# Patient Record
Sex: FEMALE | Race: OTHER | HISPANIC OR LATINO
[De-identification: names, ages, dates, MRNs, and addresses within clinical notes are randomized per-mention and may not be internally consistent; named-entity substitution may affect disease eponyms.]

---

## 2018-02-28 ENCOUNTER — APPOINTMENT (OUTPATIENT)
Dept: ANTEPARTUM | Facility: CLINIC | Age: 33
End: 2018-02-28
Payer: MEDICAID

## 2018-02-28 ENCOUNTER — ASOB RESULT (OUTPATIENT)
Age: 33
End: 2018-02-28

## 2018-02-28 PROCEDURE — 76801 OB US < 14 WKS SINGLE FETUS: CPT

## 2018-03-19 ENCOUNTER — APPOINTMENT (OUTPATIENT)
Dept: ANTEPARTUM | Facility: CLINIC | Age: 33
End: 2018-03-19

## 2018-03-20 ENCOUNTER — APPOINTMENT (OUTPATIENT)
Dept: ANTEPARTUM | Facility: CLINIC | Age: 33
End: 2018-03-20
Payer: MEDICAID

## 2018-03-20 ENCOUNTER — ASOB RESULT (OUTPATIENT)
Age: 33
End: 2018-03-20

## 2018-03-20 ENCOUNTER — APPOINTMENT (OUTPATIENT)
Dept: MATERNAL FETAL MEDICINE | Facility: CLINIC | Age: 33
End: 2018-03-20
Payer: MEDICAID

## 2018-03-20 VITALS
WEIGHT: 102.25 LBS | BODY MASS INDEX: 19.3 KG/M2 | HEIGHT: 61 IN | OXYGEN SATURATION: 99 % | DIASTOLIC BLOOD PRESSURE: 62 MMHG | RESPIRATION RATE: 16 BRPM | HEART RATE: 72 BPM | SYSTOLIC BLOOD PRESSURE: 110 MMHG

## 2018-03-20 DIAGNOSIS — Z82.3 FAMILY HISTORY OF STROKE: ICD-10-CM

## 2018-03-20 DIAGNOSIS — Z82.5 FAMILY HISTORY OF ASTHMA AND OTHER CHRONIC LOWER RESPIRATORY DISEASES: ICD-10-CM

## 2018-03-20 DIAGNOSIS — Z87.440 PERSONAL HISTORY OF URINARY (TRACT) INFECTIONS: ICD-10-CM

## 2018-03-20 DIAGNOSIS — Z82.49 FAMILY HISTORY OF ISCHEMIC HEART DISEASE AND OTHER DISEASES OF THE CIRCULATORY SYSTEM: ICD-10-CM

## 2018-03-20 DIAGNOSIS — N76.1 SUBACUTE AND CHRONIC VAGINITIS: ICD-10-CM

## 2018-03-20 DIAGNOSIS — R82.71 BACTERIURIA: ICD-10-CM

## 2018-03-20 DIAGNOSIS — Z87.51 PERSONAL HISTORY OF PRE-TERM LABOR: ICD-10-CM

## 2018-03-20 PROCEDURE — 76801 OB US < 14 WKS SINGLE FETUS: CPT

## 2018-03-20 PROCEDURE — 99212 OFFICE O/P EST SF 10 MIN: CPT

## 2018-03-20 PROCEDURE — 76813 OB US NUCHAL MEAS 1 GEST: CPT

## 2018-03-20 PROCEDURE — 36416 COLLJ CAPILLARY BLOOD SPEC: CPT

## 2018-03-20 RX ORDER — ASCORBIC ACID, CHOLECALCIFEROL, .ALPHA.-TOCOPHEROL ACETATE, DL-, PYRIDOXINE, FOLIC ACID, CYANOCOBALAMIN, CALCIUM, FERROUS FUMARATE, MAGNESIUM, DOCONEXENT 85; 200; 10; 25; 1; 12; 140; 27; 45; 300 [IU]/1; [IU]/1; [IU]/1; [IU]/1; MG/1; UG/1; MG/1; MG/1; MG/1; MG/1
CAPSULE, GELATIN COATED ORAL
Refills: 0 | Status: ACTIVE | COMMUNITY

## 2018-03-20 RX ORDER — DOCUSATE SODIUM 50 MG/1
50 CAPSULE, LIQUID FILLED ORAL
Refills: 0 | Status: ACTIVE | COMMUNITY

## 2018-03-26 LAB
1ST TRIMESTER DATA: NORMAL
ADDENDUM DOC: NORMAL
AFP PNL SERPL: NORMAL
AFP SERPL-ACNC: NORMAL
CLINICAL BIOCHEMIST REVIEW: NORMAL
FREE BETA HCG 1ST TRIMESTER: NORMAL
Lab: NORMAL
NOTES NTD: NORMAL
NT: NORMAL
PAPP-A SERPL-ACNC: NORMAL
TRISOMY 18/3: NORMAL

## 2018-04-17 ENCOUNTER — APPOINTMENT (OUTPATIENT)
Dept: ANTEPARTUM | Facility: CLINIC | Age: 33
End: 2018-04-17
Payer: MEDICAID

## 2018-04-17 ENCOUNTER — APPOINTMENT (OUTPATIENT)
Dept: MATERNAL FETAL MEDICINE | Facility: CLINIC | Age: 33
End: 2018-04-17
Payer: MEDICAID

## 2018-04-17 ENCOUNTER — ASOB RESULT (OUTPATIENT)
Age: 33
End: 2018-04-17

## 2018-04-17 VITALS
BODY MASS INDEX: 20.22 KG/M2 | WEIGHT: 107.13 LBS | OXYGEN SATURATION: 97 % | SYSTOLIC BLOOD PRESSURE: 98 MMHG | RESPIRATION RATE: 16 BRPM | HEIGHT: 61 IN | DIASTOLIC BLOOD PRESSURE: 56 MMHG

## 2018-04-17 DIAGNOSIS — O09.219 SUPERVISION OF PREGNANCY WITH HISTORY OF PRE-TERM LABOR, UNSPECIFIED TRIMESTER: ICD-10-CM

## 2018-04-17 PROCEDURE — 76816 OB US FOLLOW-UP PER FETUS: CPT

## 2018-04-17 PROCEDURE — ZZZZZ: CPT

## 2018-05-03 ENCOUNTER — MOBILE ON CALL (OUTPATIENT)
Age: 33
End: 2018-05-03

## 2018-05-09 ENCOUNTER — APPOINTMENT (OUTPATIENT)
Dept: ANTEPARTUM | Facility: CLINIC | Age: 33
End: 2018-05-09
Payer: MEDICAID

## 2018-05-09 ENCOUNTER — MED ADMIN CHARGE (OUTPATIENT)
Age: 33
End: 2018-05-09

## 2018-05-09 ENCOUNTER — ASOB RESULT (OUTPATIENT)
Age: 33
End: 2018-05-09

## 2018-05-09 PROCEDURE — 76811 OB US DETAILED SNGL FETUS: CPT

## 2018-05-09 PROCEDURE — 36415 COLL VENOUS BLD VENIPUNCTURE: CPT

## 2018-05-09 PROCEDURE — 76817 TRANSVAGINAL US OBSTETRIC: CPT

## 2018-05-09 RX ORDER — HYDROXYPROGESTERONE CAPROATE 250 MG/ML
275 INJECTION SUBCUTANEOUS WEEKLY
Refills: 0 | Status: ACTIVE | COMMUNITY
Start: 2018-05-09

## 2018-05-09 RX ORDER — HYDROXYPROGESTERONE CAPROATE 250 MG/ML
275 INJECTION SUBCUTANEOUS
Refills: 0 | Status: COMPLETED | OUTPATIENT
Start: 2018-05-09

## 2018-05-16 ENCOUNTER — APPOINTMENT (OUTPATIENT)
Dept: ANTEPARTUM | Facility: CLINIC | Age: 33
End: 2018-05-16

## 2018-05-16 LAB
1ST TRIMESTER DATA: NORMAL
2ND TRIMESTER DATA: NORMAL
AFP PNL SERPL: NORMAL
AFP SERPL-ACNC: NORMAL
AFP SERPL-ACNC: NORMAL
B-HCG FREE SERPL-MCNC: NORMAL
CLINICAL BIOCHEMIST REVIEW: NORMAL
FREE BETA HCG 1ST TRIMESTER: NORMAL
INHIBIN A SERPL-MCNC: NORMAL
NOTES NTD: NORMAL
NT: NORMAL
PAPP-A SERPL-ACNC: NORMAL
U ESTRIOL SERPL-SCNC: NORMAL

## 2018-05-16 RX ORDER — HYDROXYPROGESTERONE CAPROATE 250 MG/ML
275 INJECTION SUBCUTANEOUS
Refills: 0 | Status: COMPLETED | OUTPATIENT
Start: 2018-05-16

## 2018-05-16 RX ADMIN — HYDROXYPROGESTERONE CAPROATE 0 MG/1.1ML: 250 INJECTION SUBCUTANEOUS at 00:00

## 2018-05-23 ENCOUNTER — APPOINTMENT (OUTPATIENT)
Dept: MATERNAL FETAL MEDICINE | Facility: CLINIC | Age: 33
End: 2018-05-23
Payer: MEDICAID

## 2018-05-23 ENCOUNTER — APPOINTMENT (OUTPATIENT)
Dept: ANTEPARTUM | Facility: CLINIC | Age: 33
End: 2018-05-23

## 2018-05-23 VITALS
SYSTOLIC BLOOD PRESSURE: 112 MMHG | DIASTOLIC BLOOD PRESSURE: 60 MMHG | OXYGEN SATURATION: 98 % | HEART RATE: 80 BPM | HEIGHT: 61 IN | WEIGHT: 114.31 LBS | RESPIRATION RATE: 16 BRPM | BODY MASS INDEX: 21.58 KG/M2

## 2018-05-23 DIAGNOSIS — O09.212 SUPERVISION OF PREGNANCY WITH HISTORY OF PRE-TERM LABOR, SECOND TRIMESTER: ICD-10-CM

## 2018-05-23 PROCEDURE — 99214 OFFICE O/P EST MOD 30 MIN: CPT

## 2018-05-23 RX ORDER — HYDROXYPROGESTERONE CAPROATE 250 MG/ML
275 INJECTION SUBCUTANEOUS
Refills: 0 | Status: COMPLETED | OUTPATIENT
Start: 2018-05-23

## 2018-05-23 RX ADMIN — HYDROXYPROGESTERONE CAPROATE 0 MG/1.1ML: 250 INJECTION SUBCUTANEOUS at 00:00

## 2018-05-30 ENCOUNTER — APPOINTMENT (OUTPATIENT)
Dept: ANTEPARTUM | Facility: CLINIC | Age: 33
End: 2018-05-30

## 2018-05-30 RX ORDER — HYDROXYPROGESTERONE CAPROATE 250 MG/ML
275 INJECTION SUBCUTANEOUS
Refills: 0 | Status: COMPLETED | OUTPATIENT
Start: 2018-05-30

## 2018-05-30 RX ADMIN — HYDROXYPROGESTERONE CAPROATE 0 MG/1.1ML: 250 INJECTION SUBCUTANEOUS at 00:00

## 2018-06-06 ENCOUNTER — APPOINTMENT (OUTPATIENT)
Dept: ANTEPARTUM | Facility: CLINIC | Age: 33
End: 2018-06-06

## 2018-06-06 RX ORDER — HYDROXYPROGESTERONE CAPROATE 250 MG/ML
275 INJECTION SUBCUTANEOUS
Refills: 0 | Status: COMPLETED | OUTPATIENT
Start: 2018-06-06

## 2018-06-06 RX ADMIN — HYDROXYPROGESTERONE CAPROATE 0 MG/1.1ML: 250 INJECTION SUBCUTANEOUS at 00:00

## 2018-06-13 ENCOUNTER — APPOINTMENT (OUTPATIENT)
Dept: ANTEPARTUM | Facility: CLINIC | Age: 33
End: 2018-06-13

## 2018-06-13 RX ORDER — HYDROXYPROGESTERONE CAPROATE 250 MG/ML
275 INJECTION SUBCUTANEOUS
Refills: 0 | Status: COMPLETED | OUTPATIENT
Start: 2018-06-13

## 2018-06-13 RX ADMIN — HYDROXYPROGESTERONE CAPROATE 0 MG/1.1ML: 250 INJECTION SUBCUTANEOUS at 00:00

## 2018-06-14 ENCOUNTER — APPOINTMENT (OUTPATIENT)
Dept: ANTEPARTUM | Facility: CLINIC | Age: 33
End: 2018-06-14
Payer: MEDICAID

## 2018-06-14 ENCOUNTER — APPOINTMENT (OUTPATIENT)
Dept: PEDIATRIC CARDIOLOGY | Facility: CLINIC | Age: 33
End: 2018-06-14
Payer: MEDICAID

## 2018-06-14 ENCOUNTER — ASOB RESULT (OUTPATIENT)
Age: 33
End: 2018-06-14

## 2018-06-14 DIAGNOSIS — O35.9XX0 MATERNAL CARE FOR (SUSPECTED) FETAL ABNORMALITY AND DAMAGE, UNSPECIFIED, NOT APPLICABLE OR UNSPECIFIED: ICD-10-CM

## 2018-06-14 DIAGNOSIS — O35.2XX0 MATERNAL CARE FOR (SUSPECTED) HEREDITARY DISEASE IN FETUS, NOT APPLICABLE OR UNSPECIFIED: ICD-10-CM

## 2018-06-14 DIAGNOSIS — O09.892 SUPERVISION OF OTHER HIGH RISK PREGNANCIES, SECOND TRIMESTER: ICD-10-CM

## 2018-06-14 PROCEDURE — 76827 ECHO EXAM OF FETAL HEART: CPT

## 2018-06-14 PROCEDURE — 99203 OFFICE O/P NEW LOW 30 MIN: CPT | Mod: 25

## 2018-06-14 PROCEDURE — 76816 OB US FOLLOW-UP PER FETUS: CPT

## 2018-06-14 PROCEDURE — 93325 DOPPLER ECHO COLOR FLOW MAPG: CPT

## 2018-06-14 PROCEDURE — 76825 ECHO EXAM OF FETAL HEART: CPT

## 2018-06-19 PROBLEM — O35.9XX0 KNOWN OR SUSPECTED FETAL ANOMALY AFFECTING ANTEPARTUM CARE OF MOTHER: Status: ACTIVE | Noted: 2018-06-19

## 2018-06-19 PROBLEM — O35.2XX0 HEREDITARY DISEASE IN FAMILY POSSIBLY AFFECTING FETUS: Status: ACTIVE | Noted: 2018-06-19

## 2018-06-19 PROBLEM — O09.892: Status: ACTIVE | Noted: 2018-05-23

## 2018-06-20 ENCOUNTER — APPOINTMENT (OUTPATIENT)
Dept: ANTEPARTUM | Facility: CLINIC | Age: 33
End: 2018-06-20

## 2018-06-20 RX ORDER — HYDROXYPROGESTERONE CAPROATE 250 MG/ML
275 INJECTION SUBCUTANEOUS
Refills: 0 | Status: COMPLETED | OUTPATIENT
Start: 2018-06-20

## 2018-06-20 RX ADMIN — HYDROXYPROGESTERONE CAPROATE 0 MG/1.1ML: 250 INJECTION SUBCUTANEOUS at 00:00

## 2018-06-27 ENCOUNTER — MED ADMIN CHARGE (OUTPATIENT)
Age: 33
End: 2018-06-27

## 2018-06-27 ENCOUNTER — APPOINTMENT (OUTPATIENT)
Dept: ANTEPARTUM | Facility: CLINIC | Age: 33
End: 2018-06-27

## 2018-06-28 RX ORDER — HYDROXYPROGESTERONE CAPROATE 250 MG/ML
275 INJECTION SUBCUTANEOUS
Refills: 0 | Status: COMPLETED | OUTPATIENT
Start: 2018-06-28

## 2018-07-05 ENCOUNTER — ASOB RESULT (OUTPATIENT)
Age: 33
End: 2018-07-05

## 2018-07-05 ENCOUNTER — APPOINTMENT (OUTPATIENT)
Dept: ANTEPARTUM | Facility: CLINIC | Age: 33
End: 2018-07-05
Payer: MEDICAID

## 2018-07-05 PROCEDURE — 76817 TRANSVAGINAL US OBSTETRIC: CPT

## 2018-07-05 RX ORDER — HYDROXYPROGESTERONE CAPROATE 250 MG/ML
275 INJECTION SUBCUTANEOUS
Refills: 0 | Status: COMPLETED | OUTPATIENT
Start: 2018-07-05

## 2018-07-05 RX ADMIN — HYDROXYPROGESTERONE CAPROATE 0 MG/1.1ML: 250 INJECTION SUBCUTANEOUS at 00:00

## 2018-07-10 ENCOUNTER — APPOINTMENT (OUTPATIENT)
Dept: ANTEPARTUM | Facility: CLINIC | Age: 33
End: 2018-07-10

## 2018-07-13 ENCOUNTER — APPOINTMENT (OUTPATIENT)
Dept: ANTEPARTUM | Facility: CLINIC | Age: 33
End: 2018-07-13

## 2018-07-13 RX ORDER — HYDROXYPROGESTERONE CAPROATE 250 MG/ML
275 INJECTION SUBCUTANEOUS
Refills: 0 | Status: COMPLETED | OUTPATIENT
Start: 2018-07-13

## 2018-07-13 RX ADMIN — HYDROXYPROGESTERONE CAPROATE 0 MG/1.1ML: 250 INJECTION SUBCUTANEOUS at 00:00

## 2018-07-19 ENCOUNTER — APPOINTMENT (OUTPATIENT)
Dept: ANTEPARTUM | Facility: CLINIC | Age: 33
End: 2018-07-19

## 2018-07-19 RX ORDER — HYDROXYPROGESTERONE CAPROATE 250 MG/ML
275 INJECTION SUBCUTANEOUS
Refills: 0 | Status: COMPLETED | OUTPATIENT
Start: 2018-07-19

## 2018-07-19 RX ADMIN — HYDROXYPROGESTERONE CAPROATE 0 MG/1.1ML: 250 INJECTION SUBCUTANEOUS at 00:00

## 2018-07-24 ENCOUNTER — APPOINTMENT (OUTPATIENT)
Dept: MATERNAL FETAL MEDICINE | Facility: CLINIC | Age: 33
End: 2018-07-24
Payer: MEDICAID

## 2018-07-24 ENCOUNTER — ASOB RESULT (OUTPATIENT)
Age: 33
End: 2018-07-24

## 2018-07-24 ENCOUNTER — APPOINTMENT (OUTPATIENT)
Dept: ANTEPARTUM | Facility: CLINIC | Age: 33
End: 2018-07-24
Payer: MEDICAID

## 2018-07-24 VITALS — HEIGHT: 62 IN | WEIGHT: 123.06 LBS | BODY MASS INDEX: 22.65 KG/M2

## 2018-07-24 DIAGNOSIS — O24.410 GESTATIONAL DIABETES MELLITUS IN PREGNANCY, DIET CONTROLLED: ICD-10-CM

## 2018-07-24 DIAGNOSIS — Z83.3 FAMILY HISTORY OF DIABETES MELLITUS: ICD-10-CM

## 2018-07-24 PROCEDURE — 76816 OB US FOLLOW-UP PER FETUS: CPT

## 2018-07-24 PROCEDURE — 76819 FETAL BIOPHYS PROFIL W/O NST: CPT

## 2018-07-24 PROCEDURE — 93976 VASCULAR STUDY: CPT

## 2018-07-24 PROCEDURE — 76820 UMBILICAL ARTERY ECHO: CPT

## 2018-07-24 PROCEDURE — G0108 DIAB MANAGE TRN  PER INDIV: CPT

## 2018-07-24 RX ORDER — URINE ACETONE TEST STRIPS
STRIP MISCELLANEOUS
Qty: 2 | Refills: 1 | Status: ACTIVE | COMMUNITY
Start: 2018-07-24 | End: 1900-01-01

## 2018-07-26 ENCOUNTER — APPOINTMENT (OUTPATIENT)
Dept: ANTEPARTUM | Facility: CLINIC | Age: 33
End: 2018-07-26

## 2018-07-26 RX ORDER — HYDROXYPROGESTERONE CAPROATE 250 MG/ML
275 INJECTION SUBCUTANEOUS
Refills: 0 | Status: COMPLETED | OUTPATIENT
Start: 2018-07-26

## 2018-07-26 RX ADMIN — HYDROXYPROGESTERONE CAPROATE 0 MG/1.1ML: 250 INJECTION SUBCUTANEOUS at 00:00

## 2018-07-27 ENCOUNTER — RX CHANGE (OUTPATIENT)
Age: 33
End: 2018-07-27

## 2018-07-27 RX ORDER — BLOOD SUGAR DIAGNOSTIC
STRIP MISCELLANEOUS
Qty: 150 | Refills: 3 | Status: DISCONTINUED | COMMUNITY
Start: 2018-07-24 | End: 2018-07-27

## 2018-07-27 RX ORDER — LANCETS
EACH MISCELLANEOUS
Qty: 2 | Refills: 6 | Status: ACTIVE | COMMUNITY
Start: 2018-07-27 | End: 1900-01-01

## 2018-07-27 RX ORDER — LANCETS 30 GAUGE
EACH MISCELLANEOUS
Qty: 150 | Refills: 2 | Status: DISCONTINUED | COMMUNITY
Start: 2018-07-24 | End: 2018-07-27

## 2018-07-27 RX ORDER — BLOOD-GLUCOSE METER
W/DEVICE KIT MISCELLANEOUS
Qty: 1 | Refills: 0 | Status: ACTIVE | COMMUNITY
Start: 2018-07-27 | End: 1900-01-01

## 2018-07-31 ENCOUNTER — OTHER (OUTPATIENT)
Age: 33
End: 2018-07-31

## 2018-07-31 RX ORDER — BLOOD SUGAR DIAGNOSTIC
STRIP MISCELLANEOUS
Qty: 3 | Refills: 3 | Status: ACTIVE | COMMUNITY
Start: 2018-07-27 | End: 1900-01-01

## 2018-08-02 ENCOUNTER — APPOINTMENT (OUTPATIENT)
Dept: ANTEPARTUM | Facility: CLINIC | Age: 33
End: 2018-08-02

## 2018-08-02 ENCOUNTER — APPOINTMENT (OUTPATIENT)
Dept: MATERNAL FETAL MEDICINE | Facility: CLINIC | Age: 33
End: 2018-08-02
Payer: MEDICAID

## 2018-08-02 VITALS — HEIGHT: 62 IN | WEIGHT: 124.25 LBS | BODY MASS INDEX: 22.87 KG/M2

## 2018-08-02 DIAGNOSIS — Z3A.22 22 WEEKS GESTATION OF PREGNANCY: ICD-10-CM

## 2018-08-02 PROCEDURE — G0108 DIAB MANAGE TRN  PER INDIV: CPT

## 2018-08-02 RX ORDER — HYDROXYPROGESTERONE CAPROATE 250 MG/ML
275 INJECTION SUBCUTANEOUS
Refills: 0 | Status: COMPLETED | OUTPATIENT
Start: 2018-08-02

## 2018-08-02 RX ADMIN — HYDROXYPROGESTERONE CAPROATE 0 MG/1.1ML: 250 INJECTION SUBCUTANEOUS at 00:00

## 2018-08-03 ENCOUNTER — ASOB RESULT (OUTPATIENT)
Age: 33
End: 2018-08-03

## 2018-08-06 ENCOUNTER — APPOINTMENT (OUTPATIENT)
Dept: FAMILY MEDICINE | Facility: CLINIC | Age: 33
End: 2018-08-06
Payer: MEDICAID

## 2018-08-06 VITALS
TEMPERATURE: 98.4 F | SYSTOLIC BLOOD PRESSURE: 107 MMHG | OXYGEN SATURATION: 97 % | BODY MASS INDEX: 21.26 KG/M2 | WEIGHT: 120 LBS | HEIGHT: 63 IN | DIASTOLIC BLOOD PRESSURE: 63 MMHG | HEART RATE: 70 BPM

## 2018-08-06 DIAGNOSIS — R05 COUGH: ICD-10-CM

## 2018-08-06 DIAGNOSIS — Z76.89 PERSONS ENCOUNTERING HEALTH SERVICES IN OTHER SPECIFIED CIRCUMSTANCES: ICD-10-CM

## 2018-08-06 DIAGNOSIS — T78.40XA ALLERGY, UNSPECIFIED, INITIAL ENCOUNTER: ICD-10-CM

## 2018-08-06 PROCEDURE — 99203 OFFICE O/P NEW LOW 30 MIN: CPT

## 2018-08-06 RX ORDER — DIPHENHYDRAMINE HCL 25 MG/1
25 CAPSULE ORAL
Qty: 30 | Refills: 0 | Status: ACTIVE | COMMUNITY
Start: 2018-08-06 | End: 1900-01-01

## 2018-08-06 NOTE — ASSESSMENT
[FreeTextEntry1] : 31 y/o HF  at 33 wks EGA , presents today for persistent cough: most likely allergic.\par \par -Pt will start Benadryl with previous consult w/ OB.\par \par -F/up in 2 weeks,.

## 2018-08-06 NOTE — PAST MEDICAL HISTORY
[Menstruating] : hx of menstruating [Definite ___ (Date)] : the last menstrual period was [unfilled] [Total Preg ___] : G: [unfilled] [Premature ___] : Premature: [unfilled] [Living ___] : Living: [unfilled]

## 2018-08-06 NOTE — HISTORY OF PRESENT ILLNESS
[FreeTextEntry8] : 31 y/o HF, originally from [facility], , now at 33 weeks gestation w/ gestational DM, and hx postpartum cardiomyopathy, presents today for evaluation of persistent cough at night, states is dry and can be coughing for 2 hours. \par Pt reports symptoms for the last 2 weeks, and usually is at night after lying down, and then early in AM.\par \par States is only dry cough, no other symptoms reported. feels like something is stuck/ itchy in throat.

## 2018-08-06 NOTE — PHYSICAL EXAM

## 2018-08-09 ENCOUNTER — APPOINTMENT (OUTPATIENT)
Dept: MATERNAL FETAL MEDICINE | Facility: CLINIC | Age: 33
End: 2018-08-09

## 2018-08-09 ENCOUNTER — ASOB RESULT (OUTPATIENT)
Age: 33
End: 2018-08-09

## 2018-08-09 ENCOUNTER — APPOINTMENT (OUTPATIENT)
Dept: ANTEPARTUM | Facility: CLINIC | Age: 33
End: 2018-08-09

## 2018-08-09 VITALS — BODY MASS INDEX: 22.15 KG/M2 | HEIGHT: 63 IN | WEIGHT: 125 LBS

## 2018-08-09 RX ORDER — HYDROXYPROGESTERONE CAPROATE 250 MG/ML
275 INJECTION SUBCUTANEOUS
Refills: 0 | Status: COMPLETED | OUTPATIENT
Start: 2018-08-09

## 2018-08-09 RX ADMIN — HYDROXYPROGESTERONE CAPROATE 0 MG/1.1ML: 250 INJECTION SUBCUTANEOUS at 00:00

## 2018-08-16 ENCOUNTER — ASOB RESULT (OUTPATIENT)
Age: 33
End: 2018-08-16

## 2018-08-16 ENCOUNTER — APPOINTMENT (OUTPATIENT)
Dept: MATERNAL FETAL MEDICINE | Facility: CLINIC | Age: 33
End: 2018-08-16
Payer: MEDICAID

## 2018-08-16 ENCOUNTER — APPOINTMENT (OUTPATIENT)
Dept: ANTEPARTUM | Facility: CLINIC | Age: 33
End: 2018-08-16

## 2018-08-16 VITALS — WEIGHT: 126.44 LBS | HEIGHT: 63 IN | BODY MASS INDEX: 22.4 KG/M2

## 2018-08-16 PROCEDURE — G0108 DIAB MANAGE TRN  PER INDIV: CPT

## 2018-08-16 RX ORDER — HYDROXYPROGESTERONE CAPROATE 250 MG/ML
275 INJECTION SUBCUTANEOUS
Refills: 0 | Status: COMPLETED | OUTPATIENT
Start: 2018-08-16

## 2018-08-16 RX ADMIN — HYDROXYPROGESTERONE CAPROATE 0 MG/1.1ML: 250 INJECTION SUBCUTANEOUS at 00:00

## 2018-08-23 ENCOUNTER — APPOINTMENT (OUTPATIENT)
Dept: ANTEPARTUM | Facility: CLINIC | Age: 33
End: 2018-08-23
Payer: MEDICAID

## 2018-08-23 ENCOUNTER — ASOB RESULT (OUTPATIENT)
Age: 33
End: 2018-08-23

## 2018-08-23 PROCEDURE — 76819 FETAL BIOPHYS PROFIL W/O NST: CPT

## 2018-08-23 PROCEDURE — 76816 OB US FOLLOW-UP PER FETUS: CPT

## 2018-08-23 PROCEDURE — 93976 VASCULAR STUDY: CPT

## 2018-08-23 PROCEDURE — 76820 UMBILICAL ARTERY ECHO: CPT

## 2018-08-23 RX ORDER — HYDROXYPROGESTERONE CAPROATE 250 MG/ML
275 INJECTION SUBCUTANEOUS
Refills: 0 | Status: COMPLETED | OUTPATIENT
Start: 2018-08-23

## 2018-08-23 RX ADMIN — HYDROXYPROGESTERONE CAPROATE 0 MG/1.1ML: 250 INJECTION SUBCUTANEOUS at 00:00

## 2018-08-28 ENCOUNTER — TRANSCRIPTION ENCOUNTER (OUTPATIENT)
Age: 33
End: 2018-08-28

## 2018-08-29 ENCOUNTER — RESULT REVIEW (OUTPATIENT)
Age: 33
End: 2018-08-29

## 2018-08-29 ENCOUNTER — INPATIENT (INPATIENT)
Facility: HOSPITAL | Age: 33
LOS: 2 days | Discharge: HOME | End: 2018-09-01
Attending: OBSTETRICS & GYNECOLOGY | Admitting: OBSTETRICS & GYNECOLOGY
Payer: COMMERCIAL

## 2018-08-29 VITALS — WEIGHT: 123.46 LBS | HEIGHT: 63 IN

## 2018-08-29 DIAGNOSIS — O47.1 FALSE LABOR AT OR AFTER 37 COMPLETED WEEKS OF GESTATION: ICD-10-CM

## 2018-08-29 LAB
ABO RH CONFIRMATION: SIGNIFICANT CHANGE UP
ANION GAP SERPL CALC-SCNC: 13 MMOL/L — SIGNIFICANT CHANGE UP (ref 5–17)
BASE EXCESS BLDCOA CALC-SCNC: -5 MMOL/L — LOW (ref -2–2)
BASE EXCESS BLDCOV CALC-SCNC: -3.1 MMOL/L — LOW (ref -2–2)
BASOPHILS # BLD AUTO: 0 K/UL — SIGNIFICANT CHANGE UP (ref 0–0.2)
BASOPHILS # BLD AUTO: 0 K/UL — SIGNIFICANT CHANGE UP (ref 0–0.2)
BASOPHILS NFR BLD AUTO: 0.2 % — SIGNIFICANT CHANGE UP (ref 0–2)
BASOPHILS NFR BLD AUTO: 0.5 % — SIGNIFICANT CHANGE UP (ref 0–2)
BLD GP AB SCN SERPL QL: SIGNIFICANT CHANGE UP
BUN SERPL-MCNC: 8 MG/DL — SIGNIFICANT CHANGE UP (ref 8–20)
CALCIUM SERPL-MCNC: 8.8 MG/DL — SIGNIFICANT CHANGE UP (ref 8.6–10.2)
CHLORIDE SERPL-SCNC: 102 MMOL/L — SIGNIFICANT CHANGE UP (ref 98–107)
CO2 SERPL-SCNC: 24 MMOL/L — SIGNIFICANT CHANGE UP (ref 22–29)
CREAT SERPL-MCNC: 0.64 MG/DL — SIGNIFICANT CHANGE UP (ref 0.5–1.3)
EOSINOPHIL # BLD AUTO: 0 K/UL — SIGNIFICANT CHANGE UP (ref 0–0.5)
EOSINOPHIL # BLD AUTO: 0.2 K/UL — SIGNIFICANT CHANGE UP (ref 0–0.5)
EOSINOPHIL NFR BLD AUTO: 0.3 % — SIGNIFICANT CHANGE UP (ref 0–6)
EOSINOPHIL NFR BLD AUTO: 2.3 % — SIGNIFICANT CHANGE UP (ref 0–6)
GAS PNL BLDCOV: 7.28 — SIGNIFICANT CHANGE UP (ref 7.25–7.45)
GLUCOSE SERPL-MCNC: 82 MG/DL — SIGNIFICANT CHANGE UP (ref 70–115)
HCO3 BLDCOA-SCNC: 18 MMOL/L — LOW (ref 21–29)
HCO3 BLDCOV-SCNC: 20 MMOL/L — LOW (ref 21–29)
HCT VFR BLD CALC: 33.5 % — LOW (ref 37–47)
HCT VFR BLD CALC: 36.1 % — LOW (ref 37–47)
HGB BLD-MCNC: 10.9 G/DL — LOW (ref 12–16)
HGB BLD-MCNC: 11.6 G/DL — LOW (ref 12–16)
HIV 1 & 2 AB SERPL IA.RAPID: SIGNIFICANT CHANGE UP
LYMPHOCYTES # BLD AUTO: 1.5 K/UL — SIGNIFICANT CHANGE UP (ref 1–4.8)
LYMPHOCYTES # BLD AUTO: 1.5 K/UL — SIGNIFICANT CHANGE UP (ref 1–4.8)
LYMPHOCYTES # BLD AUTO: 17.8 % — LOW (ref 20–55)
LYMPHOCYTES # BLD AUTO: 9.9 % — LOW (ref 20–55)
MCHC RBC-ENTMCNC: 28.7 PG — SIGNIFICANT CHANGE UP (ref 27–31)
MCHC RBC-ENTMCNC: 29.1 PG — SIGNIFICANT CHANGE UP (ref 27–31)
MCHC RBC-ENTMCNC: 32.1 G/DL — SIGNIFICANT CHANGE UP (ref 32–36)
MCHC RBC-ENTMCNC: 32.5 G/DL — SIGNIFICANT CHANGE UP (ref 32–36)
MCV RBC AUTO: 89.3 FL — SIGNIFICANT CHANGE UP (ref 81–99)
MCV RBC AUTO: 89.4 FL — SIGNIFICANT CHANGE UP (ref 81–99)
MONOCYTES # BLD AUTO: 0.7 K/UL — SIGNIFICANT CHANGE UP (ref 0–0.8)
MONOCYTES # BLD AUTO: 1 K/UL — HIGH (ref 0–0.8)
MONOCYTES NFR BLD AUTO: 6.6 % — SIGNIFICANT CHANGE UP (ref 3–10)
MONOCYTES NFR BLD AUTO: 8 % — SIGNIFICANT CHANGE UP (ref 3–10)
NEUTROPHILS # BLD AUTO: 12.2 K/UL — HIGH (ref 1.8–8)
NEUTROPHILS # BLD AUTO: 5.8 K/UL — SIGNIFICANT CHANGE UP (ref 1.8–8)
NEUTROPHILS NFR BLD AUTO: 71 % — SIGNIFICANT CHANGE UP (ref 37–73)
NEUTROPHILS NFR BLD AUTO: 82.6 % — HIGH (ref 37–73)
PCO2 BLDCOA: 59.2 MMHG — SIGNIFICANT CHANGE UP (ref 32–68)
PCO2 BLDCOV: 53.6 MMHG — HIGH (ref 29–53)
PH BLDCOA: 7.22 — SIGNIFICANT CHANGE UP (ref 7.18–7.38)
PLATELET # BLD AUTO: 224 K/UL — SIGNIFICANT CHANGE UP (ref 150–400)
PLATELET # BLD AUTO: 227 K/UL — SIGNIFICANT CHANGE UP (ref 150–400)
PO2 BLDCOA: 15.3 MMHG — SIGNIFICANT CHANGE UP (ref 5.7–30.5)
PO2 BLDCOA: 22 MMHG — SIGNIFICANT CHANGE UP (ref 17–41)
POTASSIUM SERPL-MCNC: 4.1 MMOL/L — SIGNIFICANT CHANGE UP (ref 3.5–5.3)
POTASSIUM SERPL-SCNC: 4.1 MMOL/L — SIGNIFICANT CHANGE UP (ref 3.5–5.3)
RBC # BLD: 3.75 M/UL — LOW (ref 4.4–5.2)
RBC # BLD: 4.04 M/UL — LOW (ref 4.4–5.2)
RBC # FLD: 13 % — SIGNIFICANT CHANGE UP (ref 11–15.6)
RBC # FLD: 13.1 % — SIGNIFICANT CHANGE UP (ref 11–15.6)
SAO2 % BLDCOA: SIGNIFICANT CHANGE UP
SAO2 % BLDCOV: SIGNIFICANT CHANGE UP
SODIUM SERPL-SCNC: 139 MMOL/L — SIGNIFICANT CHANGE UP (ref 135–145)
TYPE + AB SCN PNL BLD: SIGNIFICANT CHANGE UP
URATE SERPL-MCNC: 4.3 MG/DL — SIGNIFICANT CHANGE UP (ref 2.4–5.7)
WBC # BLD: 14.8 K/UL — HIGH (ref 4.8–10.8)
WBC # BLD: 8.2 K/UL — SIGNIFICANT CHANGE UP (ref 4.8–10.8)
WBC # FLD AUTO: 14.8 K/UL — HIGH (ref 4.8–10.8)
WBC # FLD AUTO: 8.2 K/UL — SIGNIFICANT CHANGE UP (ref 4.8–10.8)

## 2018-08-29 PROCEDURE — 88302 TISSUE EXAM BY PATHOLOGIST: CPT | Mod: 26

## 2018-08-29 PROCEDURE — 93010 ELECTROCARDIOGRAM REPORT: CPT

## 2018-08-29 RX ORDER — NALBUPHINE HYDROCHLORIDE 10 MG/ML
5 INJECTION, SOLUTION INTRAMUSCULAR; INTRAVENOUS; SUBCUTANEOUS ONCE
Qty: 0 | Refills: 0 | Status: DISCONTINUED | OUTPATIENT
Start: 2018-08-29 | End: 2018-09-01

## 2018-08-29 RX ORDER — OXYTOCIN 10 UNIT/ML
41.67 VIAL (ML) INJECTION
Qty: 20 | Refills: 0 | Status: DISCONTINUED | OUTPATIENT
Start: 2018-08-29 | End: 2018-09-01

## 2018-08-29 RX ORDER — ONDANSETRON 8 MG/1
4 TABLET, FILM COATED ORAL EVERY 6 HOURS
Qty: 0 | Refills: 0 | Status: DISCONTINUED | OUTPATIENT
Start: 2018-08-29 | End: 2018-09-01

## 2018-08-29 RX ORDER — NALOXONE HYDROCHLORIDE 4 MG/.1ML
0.1 SPRAY NASAL
Qty: 0 | Refills: 0 | Status: DISCONTINUED | OUTPATIENT
Start: 2018-08-29 | End: 2018-08-29

## 2018-08-29 RX ORDER — NALBUPHINE HYDROCHLORIDE 10 MG/ML
2.5 INJECTION, SOLUTION INTRAMUSCULAR; INTRAVENOUS; SUBCUTANEOUS EVERY 6 HOURS
Qty: 0 | Refills: 0 | Status: DISCONTINUED | OUTPATIENT
Start: 2018-08-29 | End: 2018-08-29

## 2018-08-29 RX ORDER — SODIUM CHLORIDE 9 MG/ML
1000 INJECTION, SOLUTION INTRAVENOUS
Qty: 0 | Refills: 0 | Status: DISCONTINUED | OUTPATIENT
Start: 2018-08-29 | End: 2018-08-29

## 2018-08-29 RX ORDER — NALOXONE HYDROCHLORIDE 4 MG/.1ML
0.1 SPRAY NASAL
Qty: 0 | Refills: 0 | Status: DISCONTINUED | OUTPATIENT
Start: 2018-08-29 | End: 2018-09-01

## 2018-08-29 RX ORDER — LANOLIN
1 OINTMENT (GRAM) TOPICAL
Qty: 0 | Refills: 0 | Status: DISCONTINUED | OUTPATIENT
Start: 2018-08-29 | End: 2018-09-01

## 2018-08-29 RX ORDER — OXYTOCIN 10 UNIT/ML
333.33 VIAL (ML) INJECTION
Qty: 20 | Refills: 0 | Status: DISCONTINUED | OUTPATIENT
Start: 2018-08-29 | End: 2018-09-01

## 2018-08-29 RX ORDER — LABETALOL HCL 100 MG
200 TABLET ORAL
Qty: 0 | Refills: 0 | Status: DISCONTINUED | OUTPATIENT
Start: 2018-08-29 | End: 2018-08-30

## 2018-08-29 RX ORDER — PENICILLIN G POTASSIUM 5000000 [IU]/1
5 POWDER, FOR SOLUTION INTRAMUSCULAR; INTRAPLEURAL; INTRATHECAL; INTRAVENOUS ONCE
Qty: 0 | Refills: 0 | Status: COMPLETED | OUTPATIENT
Start: 2018-08-29 | End: 2018-08-29

## 2018-08-29 RX ORDER — FERROUS SULFATE 325(65) MG
325 TABLET ORAL DAILY
Qty: 0 | Refills: 0 | Status: DISCONTINUED | OUTPATIENT
Start: 2018-08-29 | End: 2018-09-01

## 2018-08-29 RX ORDER — SIMETHICONE 80 MG/1
80 TABLET, CHEWABLE ORAL EVERY 4 HOURS
Qty: 0 | Refills: 0 | Status: DISCONTINUED | OUTPATIENT
Start: 2018-08-29 | End: 2018-09-01

## 2018-08-29 RX ORDER — KETOROLAC TROMETHAMINE 30 MG/ML
30 SYRINGE (ML) INJECTION EVERY 6 HOURS
Qty: 0 | Refills: 0 | Status: DISCONTINUED | OUTPATIENT
Start: 2018-08-29 | End: 2018-08-29

## 2018-08-29 RX ORDER — HYDRALAZINE HCL 50 MG
5 TABLET ORAL ONCE
Qty: 0 | Refills: 0 | Status: COMPLETED | OUTPATIENT
Start: 2018-08-29 | End: 2018-08-29

## 2018-08-29 RX ORDER — ONDANSETRON 8 MG/1
4 TABLET, FILM COATED ORAL ONCE
Qty: 0 | Refills: 0 | Status: DISCONTINUED | OUTPATIENT
Start: 2018-08-29 | End: 2018-08-29

## 2018-08-29 RX ORDER — GLYCERIN ADULT
1 SUPPOSITORY, RECTAL RECTAL AT BEDTIME
Qty: 0 | Refills: 0 | Status: DISCONTINUED | OUTPATIENT
Start: 2018-08-29 | End: 2018-09-01

## 2018-08-29 RX ORDER — DIPHENHYDRAMINE HCL 50 MG
25 CAPSULE ORAL EVERY 6 HOURS
Qty: 0 | Refills: 0 | Status: DISCONTINUED | OUTPATIENT
Start: 2018-08-29 | End: 2018-09-01

## 2018-08-29 RX ORDER — DIPHENHYDRAMINE HCL 50 MG
25 CAPSULE ORAL EVERY 4 HOURS
Qty: 0 | Refills: 0 | Status: DISCONTINUED | OUTPATIENT
Start: 2018-08-29 | End: 2018-08-29

## 2018-08-29 RX ORDER — OXYTOCIN 10 UNIT/ML
41.67 VIAL (ML) INJECTION
Qty: 20 | Refills: 0 | Status: DISCONTINUED | OUTPATIENT
Start: 2018-08-29 | End: 2018-08-29

## 2018-08-29 RX ORDER — ACETAMINOPHEN 500 MG
650 TABLET ORAL EVERY 6 HOURS
Qty: 0 | Refills: 0 | Status: DISCONTINUED | OUTPATIENT
Start: 2018-08-29 | End: 2018-09-01

## 2018-08-29 RX ORDER — SODIUM CHLORIDE 9 MG/ML
1000 INJECTION, SOLUTION INTRAVENOUS
Qty: 0 | Refills: 0 | Status: DISCONTINUED | OUTPATIENT
Start: 2018-08-29 | End: 2018-09-01

## 2018-08-29 RX ORDER — IBUPROFEN 200 MG
600 TABLET ORAL EVERY 6 HOURS
Qty: 0 | Refills: 0 | Status: DISCONTINUED | OUTPATIENT
Start: 2018-08-29 | End: 2018-09-01

## 2018-08-29 RX ORDER — MAGNESIUM SULFATE 500 MG/ML
2 VIAL (ML) INJECTION
Qty: 40 | Refills: 0 | Status: DISCONTINUED | OUTPATIENT
Start: 2018-08-29 | End: 2018-09-01

## 2018-08-29 RX ORDER — PENICILLIN G POTASSIUM 5000000 [IU]/1
2.5 POWDER, FOR SOLUTION INTRAMUSCULAR; INTRAPLEURAL; INTRATHECAL; INTRAVENOUS EVERY 4 HOURS
Qty: 0 | Refills: 0 | Status: DISCONTINUED | OUTPATIENT
Start: 2018-08-29 | End: 2018-08-29

## 2018-08-29 RX ORDER — OXYCODONE AND ACETAMINOPHEN 5; 325 MG/1; MG/1
1 TABLET ORAL
Qty: 0 | Refills: 0 | Status: DISCONTINUED | OUTPATIENT
Start: 2018-08-29 | End: 2018-09-01

## 2018-08-29 RX ORDER — MAGNESIUM SULFATE 500 MG/ML
4 VIAL (ML) INJECTION ONCE
Qty: 0 | Refills: 0 | Status: COMPLETED | OUTPATIENT
Start: 2018-08-29 | End: 2018-08-29

## 2018-08-29 RX ORDER — TETANUS TOXOID, REDUCED DIPHTHERIA TOXOID AND ACELLULAR PERTUSSIS VACCINE, ADSORBED 5; 2.5; 8; 8; 2.5 [IU]/.5ML; [IU]/.5ML; UG/.5ML; UG/.5ML; UG/.5ML
0.5 SUSPENSION INTRAMUSCULAR ONCE
Qty: 0 | Refills: 0 | Status: COMPLETED | OUTPATIENT
Start: 2018-08-29

## 2018-08-29 RX ORDER — DIPHENHYDRAMINE HCL 50 MG
25 CAPSULE ORAL EVERY 4 HOURS
Qty: 0 | Refills: 0 | Status: DISCONTINUED | OUTPATIENT
Start: 2018-08-29 | End: 2018-09-01

## 2018-08-29 RX ORDER — ACETAMINOPHEN 500 MG
1000 TABLET ORAL ONCE
Qty: 0 | Refills: 0 | Status: DISCONTINUED | OUTPATIENT
Start: 2018-08-29 | End: 2018-09-01

## 2018-08-29 RX ORDER — CITRIC ACID/SODIUM CITRATE 300-500 MG
30 SOLUTION, ORAL ORAL ONCE
Qty: 0 | Refills: 0 | Status: COMPLETED | OUTPATIENT
Start: 2018-08-29 | End: 2018-08-29

## 2018-08-29 RX ORDER — ACETAMINOPHEN 500 MG
1000 TABLET ORAL ONCE
Qty: 0 | Refills: 0 | Status: DISCONTINUED | OUTPATIENT
Start: 2018-08-29 | End: 2018-08-29

## 2018-08-29 RX ORDER — DIPHENHYDRAMINE HCL 50 MG
25 CAPSULE ORAL ONCE
Qty: 0 | Refills: 0 | Status: DISCONTINUED | OUTPATIENT
Start: 2018-08-29 | End: 2018-09-01

## 2018-08-29 RX ORDER — KETOROLAC TROMETHAMINE 30 MG/ML
30 SYRINGE (ML) INJECTION EVERY 6 HOURS
Qty: 0 | Refills: 0 | Status: DISCONTINUED | OUTPATIENT
Start: 2018-08-29 | End: 2018-09-01

## 2018-08-29 RX ORDER — AZITHROMYCIN 500 MG/1
500 TABLET, FILM COATED ORAL ONCE
Qty: 0 | Refills: 0 | Status: COMPLETED | OUTPATIENT
Start: 2018-08-29 | End: 2018-08-29

## 2018-08-29 RX ORDER — SODIUM CHLORIDE 9 MG/ML
1000 INJECTION, SOLUTION INTRAVENOUS ONCE
Qty: 0 | Refills: 0 | Status: COMPLETED | OUTPATIENT
Start: 2018-08-29 | End: 2018-08-29

## 2018-08-29 RX ORDER — KETOROLAC TROMETHAMINE 30 MG/ML
30 SYRINGE (ML) INJECTION ONCE
Qty: 0 | Refills: 0 | Status: DISCONTINUED | OUTPATIENT
Start: 2018-08-29 | End: 2018-08-29

## 2018-08-29 RX ORDER — METOCLOPRAMIDE HCL 10 MG
10 TABLET ORAL ONCE
Qty: 0 | Refills: 0 | Status: DISCONTINUED | OUTPATIENT
Start: 2018-08-29 | End: 2018-09-01

## 2018-08-29 RX ORDER — DOCUSATE SODIUM 100 MG
100 CAPSULE ORAL
Qty: 0 | Refills: 0 | Status: DISCONTINUED | OUTPATIENT
Start: 2018-08-29 | End: 2018-09-01

## 2018-08-29 RX ORDER — NALBUPHINE HYDROCHLORIDE 10 MG/ML
2.5 INJECTION, SOLUTION INTRAMUSCULAR; INTRAVENOUS; SUBCUTANEOUS EVERY 6 HOURS
Qty: 0 | Refills: 0 | Status: DISCONTINUED | OUTPATIENT
Start: 2018-08-29 | End: 2018-09-01

## 2018-08-29 RX ORDER — PENICILLIN G POTASSIUM 5000000 [IU]/1
POWDER, FOR SOLUTION INTRAMUSCULAR; INTRAPLEURAL; INTRATHECAL; INTRAVENOUS
Qty: 0 | Refills: 0 | Status: DISCONTINUED | OUTPATIENT
Start: 2018-08-29 | End: 2018-08-29

## 2018-08-29 RX ORDER — ONDANSETRON 8 MG/1
4 TABLET, FILM COATED ORAL EVERY 6 HOURS
Qty: 0 | Refills: 0 | Status: DISCONTINUED | OUTPATIENT
Start: 2018-08-29 | End: 2018-08-29

## 2018-08-29 RX ORDER — OXYCODONE AND ACETAMINOPHEN 5; 325 MG/1; MG/1
2 TABLET ORAL EVERY 6 HOURS
Qty: 0 | Refills: 0 | Status: DISCONTINUED | OUTPATIENT
Start: 2018-08-29 | End: 2018-09-01

## 2018-08-29 RX ADMIN — Medication 5 MILLIGRAM(S): at 16:10

## 2018-08-29 RX ADMIN — SODIUM CHLORIDE 2000 MILLILITER(S): 9 INJECTION, SOLUTION INTRAVENOUS at 03:48

## 2018-08-29 RX ADMIN — Medication 300 GRAM(S): at 18:38

## 2018-08-29 RX ADMIN — Medication 30 MILLIGRAM(S): at 13:53

## 2018-08-29 RX ADMIN — Medication 50 GM/HR: at 19:15

## 2018-08-29 RX ADMIN — SIMETHICONE 80 MILLIGRAM(S): 80 TABLET, CHEWABLE ORAL at 19:16

## 2018-08-29 RX ADMIN — Medication 25 MILLIGRAM(S): at 19:16

## 2018-08-29 RX ADMIN — SODIUM CHLORIDE 125 MILLILITER(S): 9 INJECTION, SOLUTION INTRAVENOUS at 06:57

## 2018-08-29 RX ADMIN — PENICILLIN G POTASSIUM 200 MILLION UNIT(S): 5000000 POWDER, FOR SOLUTION INTRAMUSCULAR; INTRAPLEURAL; INTRATHECAL; INTRAVENOUS at 05:00

## 2018-08-29 RX ADMIN — Medication 30 MILLILITER(S): at 05:30

## 2018-08-29 RX ADMIN — Medication 1000 MILLIUNIT(S)/MIN: at 08:37

## 2018-08-29 RX ADMIN — Medication 30 MILLIGRAM(S): at 13:38

## 2018-08-29 RX ADMIN — AZITHROMYCIN 255 MILLIGRAM(S): 500 TABLET, FILM COATED ORAL at 06:05

## 2018-08-29 NOTE — CONSULT NOTE ADULT - SUBJECTIVE AND OBJECTIVE BOX
[place] [facility]                                   [address] [place] [address]                                                      PHONE: [phone]                                                         FAX: [phone]  [email or web address]  ---------------------------------------------------------------------------------------------------------------------------------    Reason for Consult: SOB    HPI:  BELLO BUENROSTRO is an 32y Female with history of postpartum cardiomyopathy in  that required prolonged hospital course and brief ICU stay with recovery in EF (normal echo in 2018) had uncomplicated  this morning. Postop she has had elevated BP and she had an episode of transient SOB that has since resolved. She denies any other medical history and takes no medications.     PAST MEDICAL & SURGICAL HISTORY:  Postpartum cardiomyopathy   x 2    No Known Allergies    MEDICATIONS  (STANDING):  diphtheria/tetanus/pertussis (acellular) Vaccine (ADAcel) 0.5 milliLiter(s) IntraMuscular once  ferrous    sulfate 325 milliGRAM(s) Oral daily  hydrALAZINE Injectable 5 milliGRAM(s) IV Push once  lactated ringers. 1000 milliLiter(s) (125 mL/Hr) IV Continuous <Continuous>  lactated ringers. 1000 milliLiter(s) (125 mL/Hr) IV Continuous <Continuous>  oxytocin Infusion 41.667 milliUNIT(s)/Min (125 mL/Hr) IV Continuous <Continuous>  oxytocin Infusion 333.333 milliUNIT(s)/Min (1000 mL/Hr) IV Continuous <Continuous>  prenatal multivitamin 1 Tablet(s) Oral daily    MEDICATIONS  (PRN):  acetaminophen   Tablet 650 milliGRAM(s) Oral every 6 hours PRN For Temp greater than 38.5 C (101.3 F)  acetaminophen  IVPB. 1000 milliGRAM(s) IV Intermittent once PRN Moderate Pain (4 - 6)  diphenhydrAMINE   Capsule 25 milliGRAM(s) Oral every 6 hours PRN Itching  diphenhydrAMINE   Capsule 25 milliGRAM(s) Oral every 4 hours PRN Pruritus  diphenhydrAMINE  IVPB 25 milliGRAM(s) IV Intermittent once PRN Itching  docusate sodium 100 milliGRAM(s) Oral two times a day PRN Stool Softening  glycerin Suppository - Adult 1 Suppository(s) Rectal at bedtime PRN Constipation  ibuprofen  Tablet 600 milliGRAM(s) Oral every 6 hours PRN Mild pain or headache  ketorolac   Injectable 30 milliGRAM(s) IV Push every 6 hours PRN Moderate Pain (4 - 6)  lanolin Ointment 1 Application(s) Topical every 3 hours PRN Sore Nipples  metoclopramide Injectable 10 milliGRAM(s) IV Push once PRN Nausea and/or Vomiting  nalbuphine Injectable 5 milliGRAM(s) IV Push once PRN itching  nalbuphine Injectable 2.5 milliGRAM(s) IV Push every 6 hours PRN Pruritus  naloxone Injectable 0.1 milliGRAM(s) IV Push every 3 minutes PRN For ANY of the following changes in patient status:  A. RR LESS THAN 10 breaths per minute, B. Oxygen saturation LESS THAN 90%, C. Sedation score of 6  ondansetron Injectable 4 milliGRAM(s) IV Push every 6 hours PRN Nausea  oxyCODONE    5 mG/acetaminophen 325 mG 1 Tablet(s) Oral every 3 hours PRN Moderate Pain (4 - 6)  oxyCODONE    5 mG/acetaminophen 325 mG 2 Tablet(s) Oral every 6 hours PRN Severe Pain (7 - 10)  simethicone 80 milliGRAM(s) Chew every 4 hours PRN Gas      Social History:  Cigarettes:                    Alchohol:                 Illicit Drug Abuse:      ROS: Negative other than as mentioned in HPI.    Vital Signs Last 24 Hrs  T(C): 37.1 (29 Aug 2018 15:40), Max: 37.1 (29 Aug 2018 12:30)  T(F): 98.8 (29 Aug 2018 15:40), Max: 98.8 (29 Aug 2018 15:40)  HR: 57 (29 Aug 2018 16:33) (53 - 75)  BP: 131/89 (29 Aug 2018 16:33) (122/84 - 171/-)  BP(mean): --  RR: 16 (29 Aug 2018 16:33) (14 - 23)  SpO2: 100% (29 Aug 2018 16:33) (98% - 100%)  ICU Vital Signs Last 24 Hrs  BELLOBREEZY BUENROSTRO  I&O's Detail    28 Aug 2018 07:  -  29 Aug 2018 07:00  --------------------------------------------------------  IN:    IV PiggyBack: 100 mL    Lactated Ringers IV Bolus: 1000 mL    lactated ringers.: 1000 mL  Total IN: 2100 mL    OUT:  Total OUT: 0 mL    Total NET: 2100 mL      29 Aug 2018 07:  -  29 Aug 2018 17:35  --------------------------------------------------------  IN:    oxytocin Infusion: 1000 mL  Total IN: 1000 mL    OUT:    Estimated Blood Loss: 500 mL    Indwelling Catheter - Urethral: 2400 mL  Total OUT: 2900 mL    Total NET: -1900 mL        I&O's Summary    28 Aug 2018 07:  -  29 Aug 2018 07:00  --------------------------------------------------------  IN: 2100 mL / OUT: 0 mL / NET: 2100 mL    29 Aug 2018 07:  -  29 Aug 2018 17:35  --------------------------------------------------------  IN: 1000 mL / OUT: 2900 mL / NET: -1900 mL      Drug Dosing Weight  BELLO BUENROSTRO      PHYSICAL EXAM:  General: Appears well developed, well nourished alert and cooperative.  HEENT: Head; normocephalic, atraumatic.  Eyes: Pupils reactive, cornea wnl.  Neck: Supple, no nodes adenopathy, no NVD or carotid bruit or thyromegaly.  CARDIOVASCULAR: Normal S1 and S2, No murmur, rub, gallop or lift.   LUNGS: No rales, rhonchi or wheeze. Normal breath sounds bilaterally.  ABDOMEN: Soft, nontender without mass or organomegaly. bowel sounds normoactive.  EXTREMITIES: No clubbing, cyanosis or edema. Distal pulses wnl.   SKIN: warm and dry with normal turgor.  NEURO: Alert/oriented x 3/normal motor exam. No pathologic reflexes.    PSYCH: normal affect.        LABS:                        10.9   8.2   )-----------( 227      ( 29 Aug 2018 04:16 )             33.5           BELLO BUENROSTRO            RADIOLOGY & ADDITIONAL STUDIES:    ECG: normal sinus rhythm, normal axis, normal intervals, no ischemic ST abnormality    ECHO: normal LV function in 2018    Assessment and Plan:  In summary, BELLO BUENROSTRO is an 32y Female with past medical history significant for postpartum caridomyopathy in  that resolved (recent echo 2018 with normal LV function) admitted for  earlier this morning. She has had elevated BP post op and one transient episode of SOB. EKG shows normal sinus rhythm without ischemic abnormality, and lungs are clear on exam.    HTN  - can start Labetalol 200 mg TID and titrate to desired BP (max daily dose is 2400 mg) -- if there is contraindication to labetalol, can use Procardia  - check 2D echo to evaluate LV function    Thank you for allowing [facility] to participate in the care of this patient. Please do not hesitate to call with any questions or concerns.

## 2018-08-29 NOTE — CHART NOTE - NSCHARTNOTEFT_GEN_A_CORE
S: Patient seen and examined at bedside. She stated she had blurry vision and mild dizziness which has improved and resolved. She denies any current HA, dizziness, blurry vision, CP, SOB, RUQ abd pain, N/V, dysuria. She states her feet have been swollen for the last month. She had a complaint of itchiness over her extremities which resolved after she received Benadryl. Overall she is eating well, and is urinating adequately via bowles catheter.     O:  Vitals: /72 HR 61 T 36.8  PE  Gen: NAD, resting comfortably in bed  HEENT: PERRLA, EOMI  Resp: CTA, no wheezing or crackles appreciated  CVS: RRR, normal S1/s2, no murmurs heard  Abd: normal BS  Ext: 2+ pitting edema in b/l LE    Labs  CBC Full  -  ( 29 Aug 2018 18:00 )  WBC Count : 14.8 K/uL  Hemoglobin : 11.6 g/dL  Hematocrit : 36.1 %  Platelet Count - Automated : 224 K/uL  Mean Cell Volume : 89.4 fl  Mean Cell Hemoglobin : 28.7 pg  Mean Cell Hemoglobin Concentration : 32.1 g/dL  Auto Neutrophil # : 12.2 K/uL  Auto Lymphocyte # : 1.5 K/uL  Auto Monocyte # : 1.0 K/uL  Auto Eosinophil # : 0.0 K/uL  Auto Basophil # : 0.0 K/uL  Auto Neutrophil % : 82.6 %  Auto Lymphocyte % : 9.9 %  Auto Monocyte % : 6.6 %  Auto Eosinophil % : 0.3 %  Auto Basophil % : 0.2 %        139  |  102  |  8.0  ----------------------------<  82  4.1   |  24.0  |  0.64    Ca    8.8      29 Aug 2018 18:00    A/P: 31 yo  w/ PMHx of postpartum cardiomyopathy admitted at 36 weeks 4 days with SROM and contractions s/p rCS.   -Mag treatment 2/2 preeclampsia with severe features.  -Blood pressures now within normal range, patient asymptomatic  -Continue Labetalol 200mg BID PO  -Mag started at 6:30PM  -No evidence of mag toxicity, serum level check at 12:30AM  -continue LR 75cc/hr  -Pending TTE  -Will continue to monitor

## 2018-08-29 NOTE — CHART NOTE - NSCHARTNOTEFT_GEN_A_CORE
32 y.o yo  w/ PMHx of admitted at 36 weeks 4 days with SROM and contractions s/p rCS. Pt was transferred from [facility] due to elevated BP x3 in the 160s,170s systolic. Pt was seen and evaluated in L&D. Pt complained of SOB of breath and itching similar symptoms to those she had 10 years a go when she was intubated and sent to ICU for a 20 day stay.     Pts BP was elevated on arrival to L&D. PEC labs were drawn and sent and cardiology ([place]) was consulted.   Cardio consult appreciated. Recommended TTE in the am. Agreed to plan of starting magnesium sulfate for seizure prophylaxis and recommended labetalol 200 BID for BP control.     A/P: Mag started at 6:30 pm  First mag serum level at 12:30 am 18, and then at 6:30 am.   Mag check at 9:30 pm.   Will continue to monitor BP

## 2018-08-29 NOTE — PATIENT PROFILE OB - ALCOHOL USE HISTORY SINGLE SELECT
This is a 76-year-old woman who is here because she's had a headache from the top of her face to her ear on the left side. It has been going on for 3 days now and she wasn't able to sleep at all last night. She took tramadol and it didn't help at all. She said the headache came on gradually over the last 3 days. She does not have any sinus symptoms. She has not been coughing or sneezing. She has no purulent rhinorrhea or dental pain.    Her neck is not stiff. She has pain on the left side of her neck and over her trapezius on the left.    She is currently on Coumadin because of history of a pulmonary embolus. INR was 1.9 earlier this month. She has a history of hypertension as well.    Physical exam: Vital signs were reviewed and are in the EMR. She has no pain over her TMJ joints. She does have tenderness over her posterior neck paraspinous musculature. Neck is not stiff and she has full range of motion. Pupils are round and reactive to light. Extraocular muscles are intact. She has no difficulty walking, problems with one-sided weakness or balance issues.    Cervical spine: Multilevel degenerative disc disease.    CT scan of the brain without contrast: She has no blood in her brain no sign of mass effect or hydrocephalus. She has some age-appropriate atrophy.    Impression: Headache and left-sided neck pain due to cervical arthritis    Plan: She will take her Tylenol and/or tramadol for pain. She'll put a heating pad on the back of her neck and if she continues to have problems she should consider seeing either a chiropractor or physical therapist.   never

## 2018-08-29 NOTE — BRIEF OPERATIVE NOTE - PROCEDURE
<<-----Click on this checkbox to enter Procedure  section with postpartum ligation of fallopian tubes  2018    Active  SHAY

## 2018-08-30 ENCOUNTER — TRANSCRIPTION ENCOUNTER (OUTPATIENT)
Age: 33
End: 2018-08-30

## 2018-08-30 ENCOUNTER — APPOINTMENT (OUTPATIENT)
Dept: MATERNAL FETAL MEDICINE | Facility: CLINIC | Age: 33
End: 2018-08-30

## 2018-08-30 ENCOUNTER — APPOINTMENT (OUTPATIENT)
Dept: ANTEPARTUM | Facility: CLINIC | Age: 33
End: 2018-08-30

## 2018-08-30 LAB
MAGNESIUM SERPL-MCNC: 3.2 MG/DL — HIGH (ref 1.6–2.6)
MAGNESIUM SERPL-MCNC: 6.5 MG/DL — HIGH (ref 1.8–2.6)
T PALLIDUM AB TITR SER: NEGATIVE — SIGNIFICANT CHANGE UP

## 2018-08-30 RX ORDER — TETANUS TOXOID, REDUCED DIPHTHERIA TOXOID AND ACELLULAR PERTUSSIS VACCINE, ADSORBED 5; 2.5; 8; 8; 2.5 [IU]/.5ML; [IU]/.5ML; UG/.5ML; UG/.5ML; UG/.5ML
0.5 SUSPENSION INTRAMUSCULAR ONCE
Qty: 0 | Refills: 0 | Status: COMPLETED | OUTPATIENT
Start: 2018-08-30 | End: 2018-08-30

## 2018-08-30 RX ORDER — LABETALOL HCL 100 MG
200 TABLET ORAL
Qty: 0 | Refills: 0 | Status: DISCONTINUED | OUTPATIENT
Start: 2018-08-30 | End: 2018-09-01

## 2018-08-30 RX ADMIN — OXYCODONE AND ACETAMINOPHEN 2 TABLET(S): 5; 325 TABLET ORAL at 16:35

## 2018-08-30 RX ADMIN — Medication 25 MILLIGRAM(S): at 17:28

## 2018-08-30 RX ADMIN — SIMETHICONE 80 MILLIGRAM(S): 80 TABLET, CHEWABLE ORAL at 10:18

## 2018-08-30 RX ADMIN — TETANUS TOXOID, REDUCED DIPHTHERIA TOXOID AND ACELLULAR PERTUSSIS VACCINE, ADSORBED 0.5 MILLILITER(S): 5; 2.5; 8; 8; 2.5 SUSPENSION INTRAMUSCULAR at 23:29

## 2018-08-30 RX ADMIN — OXYCODONE AND ACETAMINOPHEN 2 TABLET(S): 5; 325 TABLET ORAL at 17:35

## 2018-08-30 RX ADMIN — Medication 200 MILLIGRAM(S): at 18:13

## 2018-08-30 RX ADMIN — Medication 1 TABLET(S): at 12:10

## 2018-08-30 RX ADMIN — Medication 600 MILLIGRAM(S): at 15:55

## 2018-08-30 RX ADMIN — Medication 325 MILLIGRAM(S): at 12:10

## 2018-08-30 RX ADMIN — SODIUM CHLORIDE 75 MILLILITER(S): 9 INJECTION, SOLUTION INTRAVENOUS at 02:00

## 2018-08-30 RX ADMIN — Medication 600 MILLIGRAM(S): at 14:59

## 2018-08-30 RX ADMIN — Medication 600 MILLIGRAM(S): at 09:03

## 2018-08-30 NOTE — PROGRESS NOTE ADULT - SUBJECTIVE AND OBJECTIVE BOX
Patient is a 33yo  now POD#1 s/p repeat  section a/f  labor    S:    No acute events overnight.  Pt seen and examined at bedside. Pt doing well.  Has no complaints.  Pain well controlled on current regiment.  Pt ambulating, tolerating PO, voiding w/o difficulty, +flatus/-BM.    O:    T(C): 36.8 (18 @ 04:00), Max: 37.1 (18 @ 12:30)  HR: 70 (18 @ 04:00) (53 - 79)  BP: 104/63 (18 @ 04:00) (104/63 - 171/-)  RR: 16 (18 @ 04:00) (14 - 23)  SpO2: 98% (18 @ 04:00) (98% - 100%)  Wt(kg): --    Physical Exam  Breast: NT, non-engorged  Abdomen:  soft, NT, ND, BS+  Uterus:  firm below umbilicus  VE:  expectant lochia  Ext:  NT, nonedematous                          11.6   14.8  )-----------( 224      ( 29 Aug 2018 18:00 )             36.1         139  |  102  |  8.0  ----------------------------<  82  4.1   |  24.0  |  0.64    Ca    8.8      29 Aug 2018 18:00  Mg     6.5

## 2018-08-30 NOTE — CHART NOTE - NSCHARTNOTEFT_GEN_A_CORE
Discussed with Dr. Avelar    Pt has not had any elevated blood pressures since the one episode of elevated blood pressures requiring 5 of hydralazine.  Pt systolic between 100-120s.     A/P:  DC magnesium  Hold labetalol am dose and monitor vitals  Pt to be transferred to [facility]

## 2018-08-30 NOTE — DISCHARGE NOTE OB - CARE PROVIDER_API CALL
Jeannette Durán (MD), Obstetrics and Gynecology  [address]  [address]  [place] [address]  Phone: [phone]  Fax: [phone]

## 2018-08-30 NOTE — DISCHARGE NOTE OB - PATIENT PORTAL LINK FT
You can access the [facility] Patient Portal, offered by [facility], by registering with the following website: [email or web address]

## 2018-08-30 NOTE — DISCHARGE NOTE OB - PLAN OF CARE
recovery Pt hemodynamically stable, meeting expected milestones of recovery, and is ready to be discharged. Pt to follow up in the outpatient clinic

## 2018-08-30 NOTE — CHART NOTE - NSCHARTNOTEFT_GEN_A_CORE
Spoke with Dr. Avelar regarding update of patient    Magnesium serum level 6.7  Pt remains largely asymptomatic except for slightly diminished brachial reflexes    A/P - pt magnesium to be decreased to 1g/hr.  Pt to remain on mag and reassessed after 12 hours of magnesium.  Continue to monitor blood pressures. To not give labetalol overnight and reassess in the AM.

## 2018-08-30 NOTE — DISCHARGE NOTE OB - MATERIALS PROVIDED
[facility] Screening Program/Breastfeeding Log/Breastfeeding Mother’s Support Group Information/Guide to Postpartum Care/Discharge Medication Information for Patients and Families Pocket Guide/Tdap Vaccination (VIS Pub Date: 2012)/Birth Certificate Instructions/Back To Sleep Handout/Breastfeeding Guide and Packet [facility] Screening Program/Tdap Vaccination (VIS Pub Date: 2012)/Discharge Medication Information for Patients and Families Pocket Guide/Breastfeeding Log/Back To Sleep Handout/Shaken Baby Prevention Handout/Birth Certificate Instructions/Breastfeeding Mother’s Support Group Information/Guide to Postpartum Care/Breastfeeding Guide and Packet

## 2018-08-30 NOTE — DISCHARGE NOTE OB - MEDICATION SUMMARY - MEDICATIONS TO TAKE
I will START or STAY ON the medications listed below when I get home from the hospital:    ibuprofen 600 mg oral tablet  -- 1 tab(s) by mouth every 6 hours, As Needed -for mild pain   -- Do not take this drug if you are pregnant.  It is very important that you take or use this exactly as directed.  Do not skip doses or discontinue unless directed by your doctor.  May cause drowsiness or dizziness.  Obtain medical advice before taking any non-prescription drugs as some may affect the action of this medication.  Take with food or milk.    -- Indication: For mild pain    oxyCODONE-acetaminophen 5 mg-325 mg oral tablet  -- 1 tab(s) by mouth every 6 hours, As Needed -for moderate pain MDD:4 tabs  -- Caution federal law prohibits the transfer of this drug to any person other  than the person for whom it was prescribed.  May cause drowsiness.  Alcohol may intensify this effect.  Use care when operating dangerous machinery.  This prescription cannot be refilled.  This product contains acetaminophen.  Do not use  with any other product containing acetaminophen to prevent possible liver damage.  Using more of this medication than prescribed may cause serious breathing problems.    -- Indication: For moderate pain    Colace 100 mg oral capsule  -- 1 cap(s) by mouth 2 times a day, As Needed -for constipation   -- Medication should be taken with plenty of water.    -- Indication: For Constipation

## 2018-08-30 NOTE — CHART NOTE - NSCHARTNOTEFT_GEN_A_CORE
S: Patient seen and examined at bedside. Patient is currently asymptomatic and denies any active complaints. She denies any current HA, dizziness, blurry vision, CP, SOB, RUQ abd pain, N/V, dysuria. Producing adequate urine output via bowles catheter.     O:  Vitals: /72 HR 61 T 36.8  PE  Gen: NAD, resting comfortably in bed  HEENT: PERRLA, EOMI  Resp: CTA, no wheezing or crackles appreciated  CVS: RRR, normal S1/s2, no murmurs heard  Abd: normal BS  Ext: 2+ pitting edema in b/l LE    I&O's Summary    28 Aug 2018 07:  -  29 Aug 2018 07:00  --------------------------------------------------------  IN: 2100 mL / OUT: 0 mL / NET: 2100 mL    29 Aug 2018 07:01  -  30 Aug 2018 02:28  --------------------------------------------------------  IN: 1612.5 mL / OUT: 5765 mL / NET: -4152.5 mL    Labs  Magnesium, serum: 6.5 (2018 00:30)     A/P: 33 yo  w/ PMHx of postpartum cardiomyopathy admitted at 36 weeks 4 days with SROM and contractions s/p rCS.   -Mag started at 6:30PM, serum level 6.5   -Mag infusion reduced to 1mg/hr from 2 mg/hr  -No evidence of mag toxicity, next serum level check at 06:30AM  -Mag treatment 2/2 preeclampsia with severe features.  -Blood pressures now within normal range, patient asymptomatic  -Continue Labetalol 200mg BID PO  -continue LR 75cc/hr  -Pending TTE  -Will continue to monitor. S: Patient seen and examined at bedside. Patient is currently asymptomatic and denies any active complaints. She denies any current HA, dizziness, blurry vision, CP, SOB, RUQ abd pain, N/V, dysuria. Producing adequate urine output via bowles catheter.     O:  Vitals: /72 HR 61 T 36.8  PE  Gen: NAD, resting comfortably in bed  HEENT: PERRLA, EOMI  Resp: CTA, no wheezing or crackles appreciated  CVS: RRR, normal S1/s2, no murmurs heard  Abd: normal BS  Ext: 2+ pitting edema in b/l LE    I&O's Summary    28 Aug 2018 07:  -  29 Aug 2018 07:00  --------------------------------------------------------  IN: 2100 mL / OUT: 0 mL / NET: 2100 mL    29 Aug 2018 07:01  -  30 Aug 2018 02:28  --------------------------------------------------------  IN: 1612.5 mL / OUT: 5765 mL / NET: -4152.5 mL    Labs  Magnesium, serum: 6.5 (2018 00:30)     A/P: 31 yo  w/ PMHx of postpartum cardiomyopathy admitted at 36 weeks 4 days with SROM and contractions s/p rCS.   -Mag started at 6:30PM, serum level 6.5   -Mag infusion reduced to 1 g/hr from 2 g/hr  -No evidence of mag toxicity, next serum level check at 06:30AM  -Mag treatment 2/2 preeclampsia with severe features.  -Blood pressures now within normal range, patient asymptomatic  -Continue Labetalol 200mg BID PO  -continue LR 75cc/hr  -Pending TTE  -Will continue to monitor.

## 2018-08-30 NOTE — DISCHARGE NOTE OB - CARE PLAN
Principal Discharge DX:	 delivery delivered  Goal:	recovery  Assessment and plan of treatment:	Pt hemodynamically stable, meeting expected milestones of recovery, and is ready to be discharged. Pt to follow up in the outpatient clinic

## 2018-08-30 NOTE — PROGRESS NOTE ADULT - ASSESSMENT
A/P:  33yo  now POD#1 s/p repeat  section a/f  labor  -Stable  -Voiding, tolerating PO, bowel function nml   -Advance care as tolerated   -Continue routine postpartum/postoperative care and education.  -Pt meeting expected day 1 milestones

## 2018-08-30 NOTE — PROGRESS NOTE ADULT - SUBJECTIVE AND OBJECTIVE BOX
[facility] - [facility]                                   [address] [place] [address]                                                      PHONE: [phone]                                                         FAX: [phone]  [email or web address]  ---------------------------------------------------------------------------------------------------------------------------------    Overnight events/patient complaints: Patient seen at bedside. She feels better today.       No Known Allergies    MEDICATIONS  (STANDING):  diphtheria/tetanus/pertussis (acellular) Vaccine (ADAcel) 0.5 milliLiter(s) IntraMuscular once  ferrous    sulfate 325 milliGRAM(s) Oral daily  labetalol 200 milliGRAM(s) Oral two times a day  lactated ringers. 1000 milliLiter(s) (75 mL/Hr) IV Continuous <Continuous>  magnesium sulfate Infusion 2 Gm/Hr (50 mL/Hr) IV Continuous <Continuous>  oxytocin Infusion 41.667 milliUNIT(s)/Min (125 mL/Hr) IV Continuous <Continuous>  oxytocin Infusion 333.333 milliUNIT(s)/Min (1000 mL/Hr) IV Continuous <Continuous>  prenatal multivitamin 1 Tablet(s) Oral daily    MEDICATIONS  (PRN):  acetaminophen   Tablet 650 milliGRAM(s) Oral every 6 hours PRN For Temp greater than 38.5 C (101.3 F)  acetaminophen  IVPB. 1000 milliGRAM(s) IV Intermittent once PRN Moderate Pain (4 - 6)  diphenhydrAMINE   Capsule 25 milliGRAM(s) Oral every 6 hours PRN Itching  diphenhydrAMINE   Capsule 25 milliGRAM(s) Oral every 4 hours PRN Pruritus  diphenhydrAMINE  IVPB 25 milliGRAM(s) IV Intermittent once PRN Itching  docusate sodium 100 milliGRAM(s) Oral two times a day PRN Stool Softening  glycerin Suppository - Adult 1 Suppository(s) Rectal at bedtime PRN Constipation  ibuprofen  Tablet 600 milliGRAM(s) Oral every 6 hours PRN Mild pain or headache  ketorolac   Injectable 30 milliGRAM(s) IV Push every 6 hours PRN Moderate Pain (4 - 6)  lanolin Ointment 1 Application(s) Topical every 3 hours PRN Sore Nipples  metoclopramide Injectable 10 milliGRAM(s) IV Push once PRN Nausea and/or Vomiting  nalbuphine Injectable 5 milliGRAM(s) IV Push once PRN itching  nalbuphine Injectable 2.5 milliGRAM(s) IV Push every 6 hours PRN Pruritus  naloxone Injectable 0.1 milliGRAM(s) IV Push every 3 minutes PRN For ANY of the following changes in patient status:  A. RR LESS THAN 10 breaths per minute, B. Oxygen saturation LESS THAN 90%, C. Sedation score of 6  ondansetron Injectable 4 milliGRAM(s) IV Push every 6 hours PRN Nausea  oxyCODONE    5 mG/acetaminophen 325 mG 1 Tablet(s) Oral every 3 hours PRN Moderate Pain (4 - 6)  oxyCODONE    5 mG/acetaminophen 325 mG 2 Tablet(s) Oral every 6 hours PRN Severe Pain (7 - 10)  simethicone 80 milliGRAM(s) Chew every 4 hours PRN Gas      Vital Signs Last 24 Hrs  T(C): 36.6 (30 Aug 2018 11:54), Max: 37.1 (29 Aug 2018 15:40)  T(F): 97.8 (30 Aug 2018 11:54), Max: 98.8 (29 Aug 2018 15:40)  HR: 65 (30 Aug 2018 11:54) (53 - 95)  BP: 109/75 (30 Aug 2018 11:54) (99/69 - 171/-)  BP(mean): 87 (30 Aug 2018 07:00) (76 - 95)  RR: 18 (30 Aug 2018 11:54) (14 - 18)  SpO2: 99% (30 Aug 2018 07:00) (98% - 100%)  ICU Vital Signs Last 24 Hrs  BELLO BUENROSTRO  I&O's Detail    29 Aug 2018 07:01  -  30 Aug 2018 07:00  --------------------------------------------------------  IN:    lactated ringers.: 225 mL    lactated ringers.: 675 mL    magnesium sulfate  Infusion: 412.5 mL    oxytocin Infusion: 1000 mL  Total IN: 2312.5 mL    OUT:    Estimated Blood Loss: 500 mL    Indwelling Catheter - Urethral: 5785 mL  Total OUT: 6285 mL    Total NET: -3972.5 mL        Drug Dosing Weight  BELLO BUENROSTRO      PHYSICAL EXAM:  General: Appears well developed, well nourished alert and cooperative.  HEENT: Head; normocephalic, atraumatic.  Eyes: Pupils reactive, cornea wnl.  Neck: Supple, no nodes adenopathy, no NVD or carotid bruit or thyromegaly.  CARDIOVASCULAR: Normal S1 and S2, No murmur, rub, gallop or lift.   LUNGS: No rales, rhonchi or wheeze. Normal breath sounds bilaterally.  ABDOMEN: Soft, nontender without mass or organomegaly. bowel sounds normoactive.  EXTREMITIES: No clubbing, cyanosis or edema. Distal pulses wnl.   SKIN: warm and dry with normal turgor.  NEURO: Alert/oriented x 3/normal motor exam. No pathologic reflexes.    PSYCH: normal affect.        LABS:                        11.6   14.8  )-----------( 224      ( 29 Aug 2018 18:00 )             36.1         139  |  102  |  8.0  ----------------------------<  82  4.1   |  24.0  |  0.64    Ca    8.8      29 Aug 2018 18:00  Mg     6.5           BELLO BUENROSTRO    RADIOLOGY & ADDITIONAL STUDIES:    ECHO: 1. Left ventricular ejection fraction, by visual estimation, is 60 to   65%.   2. Normal global left ventricular systolic function.   3. Trace tricuspid regurgitation.   4. Trace pulmonic valve regurgitation.      ASSESSMENT AND PLAN:  In summary, BELLO BUENROSTRO is an 32y Female with past medical history significant for postpartum caridomyopathy in  that resolved (recent echo 2018 with normal LV function) admitted for . She had elevated BP post op and one transient episode of SOB. EKG showed normal sinus rhythm without ischemic abnormality, and 2D echo performed this morning shows normal LV function without significant valvular disease. BP has improved with Labetalol.  HTN  - can continue Labetalol at current dose  - no further inpatient cardiac workup indicated at this time. Patient can follow up in our office for further management.     Thank you for allowing [facility] to participate in the care of this patient. Please do not hesitate to call with any questions or concerns.

## 2018-08-31 RX ADMIN — Medication 325 MILLIGRAM(S): at 16:03

## 2018-08-31 RX ADMIN — Medication 600 MILLIGRAM(S): at 23:04

## 2018-08-31 RX ADMIN — Medication 600 MILLIGRAM(S): at 16:34

## 2018-08-31 RX ADMIN — SIMETHICONE 80 MILLIGRAM(S): 80 TABLET, CHEWABLE ORAL at 08:22

## 2018-08-31 RX ADMIN — Medication 600 MILLIGRAM(S): at 02:29

## 2018-08-31 RX ADMIN — Medication 100 MILLIGRAM(S): at 08:22

## 2018-08-31 RX ADMIN — Medication 600 MILLIGRAM(S): at 16:04

## 2018-08-31 RX ADMIN — Medication 600 MILLIGRAM(S): at 08:52

## 2018-08-31 RX ADMIN — SIMETHICONE 80 MILLIGRAM(S): 80 TABLET, CHEWABLE ORAL at 16:04

## 2018-08-31 RX ADMIN — Medication 600 MILLIGRAM(S): at 03:29

## 2018-08-31 RX ADMIN — Medication 600 MILLIGRAM(S): at 08:22

## 2018-08-31 RX ADMIN — Medication 1 TABLET(S): at 16:03

## 2018-08-31 RX ADMIN — Medication 100 MILLIGRAM(S): at 16:04

## 2018-08-31 NOTE — PROGRESS NOTE ADULT - ATTENDING COMMENTS
Section Day: 3  She feels well  She had blood pressure elevations postpartum warranting me to keep her an additional day.    Continue the current pain medication  Encourage Ambulation  Encourage regular diet   DVT ppx: SCDs if not ambulating, but she is ambulating.  She is stable, tolerates a diet and has normal flatus and bowel movements  She will be discharged on Day  4

## 2018-08-31 NOTE — PROGRESS NOTE ADULT - SUBJECTIVE AND OBJECTIVE BOX
32y year old  now POD#2 s/p repeat  section at 36wks gestation after presenting in  labor.      No acute overnight events. Pain well controlled.  Patient is ambulating, +voiding, +Flatus, -BM  Reports minimal lochia.   +Breast feeding, -breast tenderness    VS:   Vital Signs Last 24 Hrs  T(C): 36.9 (31 Aug 2018 04:54), Max: 37.2 (31 Aug 2018 00:33)  T(F): 98.4 (31 Aug 2018 04:54), Max: 98.9 (31 Aug 2018 00:33)  HR: 67 (31 Aug 2018 04:54) (65 - 95)  BP: 101/62 (31 Aug 2018 04:54) (99/69 - 120/77)  BP(mean): 87 (30 Aug 2018 07:00) (87 - 87)  RR: 18 (31 Aug 2018 04:54) (16 - 18)  SpO2: 98% (31 Aug 2018 04:54) (98% - 99%)    Physical Exam:  General: NAD  Abdomen: ND, firm fundus palpated at the umbilicus. Incision clean, dry, and intact.  Ext: nontender lower extremities.

## 2018-09-01 VITALS
TEMPERATURE: 98 F | SYSTOLIC BLOOD PRESSURE: 106 MMHG | DIASTOLIC BLOOD PRESSURE: 69 MMHG | RESPIRATION RATE: 18 BRPM | HEART RATE: 61 BPM

## 2018-09-01 PROCEDURE — 85027 COMPLETE CBC AUTOMATED: CPT

## 2018-09-01 PROCEDURE — 59050 FETAL MONITOR W/REPORT: CPT

## 2018-09-01 PROCEDURE — 36415 COLL VENOUS BLD VENIPUNCTURE: CPT

## 2018-09-01 PROCEDURE — 88302 TISSUE EXAM BY PATHOLOGIST: CPT

## 2018-09-01 PROCEDURE — 86703 HIV-1/HIV-2 1 RESULT ANTBDY: CPT

## 2018-09-01 PROCEDURE — 86900 BLOOD TYPING SEROLOGIC ABO: CPT

## 2018-09-01 PROCEDURE — G0463: CPT

## 2018-09-01 PROCEDURE — 86901 BLOOD TYPING SEROLOGIC RH(D): CPT

## 2018-09-01 PROCEDURE — 93306 TTE W/DOPPLER COMPLETE: CPT

## 2018-09-01 PROCEDURE — T1013: CPT

## 2018-09-01 PROCEDURE — 86850 RBC ANTIBODY SCREEN: CPT

## 2018-09-01 PROCEDURE — 86780 TREPONEMA PALLIDUM: CPT

## 2018-09-01 PROCEDURE — 82803 BLOOD GASES ANY COMBINATION: CPT

## 2018-09-01 PROCEDURE — 93005 ELECTROCARDIOGRAM TRACING: CPT

## 2018-09-01 PROCEDURE — 90715 TDAP VACCINE 7 YRS/> IM: CPT

## 2018-09-01 PROCEDURE — 83735 ASSAY OF MAGNESIUM: CPT

## 2018-09-01 PROCEDURE — 84550 ASSAY OF BLOOD/URIC ACID: CPT

## 2018-09-01 PROCEDURE — 80048 BASIC METABOLIC PNL TOTAL CA: CPT

## 2018-09-01 RX ORDER — IBUPROFEN 200 MG
1 TABLET ORAL
Qty: 28 | Refills: 0 | OUTPATIENT
Start: 2018-09-01 | End: 2018-09-07

## 2018-09-01 RX ORDER — DOCUSATE SODIUM 100 MG
1 CAPSULE ORAL
Qty: 14 | Refills: 0 | OUTPATIENT
Start: 2018-09-01 | End: 2018-09-07

## 2018-09-01 RX ADMIN — SIMETHICONE 80 MILLIGRAM(S): 80 TABLET, CHEWABLE ORAL at 09:08

## 2018-09-01 RX ADMIN — Medication 100 MILLIGRAM(S): at 09:08

## 2018-09-01 RX ADMIN — Medication 600 MILLIGRAM(S): at 04:55

## 2018-09-01 RX ADMIN — OXYCODONE AND ACETAMINOPHEN 2 TABLET(S): 5; 325 TABLET ORAL at 09:08

## 2018-09-01 RX ADMIN — Medication 600 MILLIGRAM(S): at 00:04

## 2018-09-01 NOTE — PROGRESS NOTE ADULT - ATTENDING COMMENTS
Attending  pt seen and evaluated  staples removed  stable for dc home  rto on Thursday for wound evaluation

## 2018-09-01 NOTE — PROGRESS NOTE ADULT - ASSESSMENT
A/P:  31yo  now POD#3 s/p repeat  section  -Stable  -Voiding, tolerating PO, bowel function nml   -Advance care as tolerated   -Continue routine postpartum/postoperative care and education.  -Pt recovering well, meeting expected day 3 milestones, likely to be discharged today after attending rounds

## 2018-09-01 NOTE — PROGRESS NOTE ADULT - SUBJECTIVE AND OBJECTIVE BOX
Patient is a 33yo  now POD#3 s/p repeat  section a/f  labor    S:    No acute events overnight.  Pt seen and examined at bedside. Pt doing well.  Has no complaints.  Pain well controlled on current regiment.  Pt ambulating, tolerating PO, voiding w/o difficulty, +flatus/-BM.    O:    T(C): 36.8 (18 @ 00:04), Max: 37.2 (18 @ 08:10)  HR: 56 (18 @ 00:04) (56 - 70)  BP: 119/70 (18 @ 00:04) (95/55 - 119/70)  RR: 18 (18 @ 00:04) (16 - 18)  SpO2: --  Wt(kg): --    Physical Exam  Breast: NT, non-engorged  Abdomen:  soft, NT, ND, BS+  Uterus:  firm below umbilicus  VE:  expectant lochia  Ext:  NT, nonedematous

## 2018-09-04 LAB — SURGICAL PATHOLOGY FINAL REPORT - CH: SIGNIFICANT CHANGE UP

## 2018-09-06 ENCOUNTER — APPOINTMENT (OUTPATIENT)
Dept: ANTEPARTUM | Facility: CLINIC | Age: 33
End: 2018-09-06

## 2018-09-13 ENCOUNTER — APPOINTMENT (OUTPATIENT)
Dept: ANTEPARTUM | Facility: CLINIC | Age: 33
End: 2018-09-13

## 2018-11-15 ENCOUNTER — APPOINTMENT (OUTPATIENT)
Dept: FAMILY MEDICINE | Facility: CLINIC | Age: 33
End: 2018-11-15
Payer: MEDICAID

## 2018-11-15 VITALS
TEMPERATURE: 97.4 F | HEART RATE: 58 BPM | HEIGHT: 63 IN | WEIGHT: 103 LBS | OXYGEN SATURATION: 99 % | SYSTOLIC BLOOD PRESSURE: 99 MMHG | DIASTOLIC BLOOD PRESSURE: 66 MMHG | BODY MASS INDEX: 18.25 KG/M2

## 2018-11-15 DIAGNOSIS — R05 COUGH: ICD-10-CM

## 2018-11-15 PROCEDURE — 99213 OFFICE O/P EST LOW 20 MIN: CPT

## 2018-11-15 RX ORDER — RANITIDINE 300 MG/1
300 TABLET ORAL DAILY
Qty: 15 | Refills: 0 | Status: ACTIVE | COMMUNITY
Start: 2018-11-15 | End: 1900-01-01

## 2018-11-15 NOTE — PLAN
[FreeTextEntry1] : \par \par Case discussed with and reviewed by supervising attending Dr. Shelby Chaudhry M.D.

## 2018-11-15 NOTE — PHYSICAL EXAM
[No Acute Distress] : no acute distress [Normal Outer Ear/Nose] : the outer ears and nose were normal in appearance [Normal Oropharynx] : the oropharynx was normal [No JVD] : no jugular venous distention [Supple] : supple [No Lymphadenopathy] : no lymphadenopathy [Thyroid Normal, No Nodules] : the thyroid was normal and there were no nodules present [No Respiratory Distress] : no respiratory distress  [Clear to Auscultation] : lungs were clear to auscultation bilaterally [No Accessory Muscle Use] : no accessory muscle use [Normal Rate] : normal rate  [Regular Rhythm] : with a regular rhythm [Normal S1, S2] : normal S1 and S2 [No Murmur] : no murmur heard

## 2018-11-15 NOTE — HISTORY OF PRESENT ILLNESS
[FreeTextEntry8] : 1 week h/o cough which is dry, states that she had a cold one week ago now only with cough. Denies fever, states that cough is worse at night, states that she has an itchiness in her throat the makes her cough, has been taking Claritin with some relief. Pt is currently breastfeeding a 2 month old baby boy named Terry.

## 2018-11-15 NOTE — ASSESSMENT
[FreeTextEntry1] : Persistent cough\par -Allergy vs Reflux\par -Already on Claritin, will add Ranitidine 300 mg at bedtime.\par -May take Robitussin DM q6-8 hrs for 2-3 days max if symptoms do not improve.\par -RTO if symptoms do not improve\par -Recommend Humidifier during Winter season at bedtime.

## 2019-02-03 PROBLEM — T78.40XA ALLERGY: Status: ACTIVE | Noted: 2018-08-06

## 2021-07-22 NOTE — PROGRESS NOTE ADULT - PROBLEM/PLAN-1
Addended by: WILLIAM BLANCA on: 7/22/2021 10:39 AM     Modules accepted: Orders     DISPLAY PLAN FREE TEXT

## 2023-08-22 NOTE — PATIENT PROFILE OB - PRO CORD BLOOD BANK YN OB
Patient is calling regarding cancelling a procedure.     GI COLONOSCOPY & EGD    Date/Time: 08/23    Performing Physician: Sami Sanders    Patient requesting call back to reschedule: YES [] NO [x]
Procedure has been canceled
no